# Patient Record
Sex: MALE | Race: WHITE | NOT HISPANIC OR LATINO | Employment: OTHER | URBAN - METROPOLITAN AREA
[De-identification: names, ages, dates, MRNs, and addresses within clinical notes are randomized per-mention and may not be internally consistent; named-entity substitution may affect disease eponyms.]

---

## 2017-04-13 ENCOUNTER — HOSPITAL ENCOUNTER (EMERGENCY)
Facility: HOSPITAL | Age: 62
Discharge: ANOTHER HOSPITAL | End: 2017-04-13
Attending: SURGERY
Payer: OTHER MISCELLANEOUS

## 2017-04-13 VITALS
TEMPERATURE: 98 F | HEART RATE: 76 BPM | WEIGHT: 248 LBS | RESPIRATION RATE: 16 BRPM | DIASTOLIC BLOOD PRESSURE: 80 MMHG | SYSTOLIC BLOOD PRESSURE: 154 MMHG

## 2017-04-13 DIAGNOSIS — S67.22XA HAND CRUSH INJURY, LEFT, INITIAL ENCOUNTER: Primary | ICD-10-CM

## 2017-04-13 DIAGNOSIS — S62.92XA HAND FRACTURE, LEFT, CLOSED, INITIAL ENCOUNTER: ICD-10-CM

## 2017-04-13 PROCEDURE — 96365 THER/PROPH/DIAG IV INF INIT: CPT

## 2017-04-13 PROCEDURE — 96375 TX/PRO/DX INJ NEW DRUG ADDON: CPT

## 2017-04-13 PROCEDURE — 25000003 PHARM REV CODE 250: Performed by: SURGERY

## 2017-04-13 PROCEDURE — 90471 IMMUNIZATION ADMIN: CPT | Performed by: SURGERY

## 2017-04-13 PROCEDURE — 90715 TDAP VACCINE 7 YRS/> IM: CPT | Performed by: SURGERY

## 2017-04-13 PROCEDURE — 99285 EMERGENCY DEPT VISIT HI MDM: CPT | Mod: 25

## 2017-04-13 PROCEDURE — 96367 TX/PROPH/DG ADDL SEQ IV INF: CPT

## 2017-04-13 PROCEDURE — 63600175 PHARM REV CODE 636 W HCPCS: Performed by: SURGERY

## 2017-04-13 RX ORDER — HYDROMORPHONE HYDROCHLORIDE 1 MG/ML
1 INJECTION, SOLUTION INTRAMUSCULAR; INTRAVENOUS; SUBCUTANEOUS
Status: COMPLETED | OUTPATIENT
Start: 2017-04-13 | End: 2017-04-13

## 2017-04-13 RX ADMIN — HYDROMORPHONE HYDROCHLORIDE 1 MG: 1 INJECTION, SOLUTION INTRAMUSCULAR; INTRAVENOUS; SUBCUTANEOUS at 10:04

## 2017-04-13 RX ADMIN — CLOSTRIDIUM TETANI TOXOID ANTIGEN (FORMALDEHYDE INACTIVATED), CORYNEBACTERIUM DIPHTHERIAE TOXOID ANTIGEN (FORMALDEHYDE INACTIVATED), BORDETELLA PERTUSSIS TOXOID ANTIGEN (GLUTARALDEHYDE INACTIVATED), BORDETELLA PERTUSSIS FILAMENTOUS HEMAGGLUTININ ANTIGEN (FORMALDEHYDE INACTIVATED), BORDETELLA PERTUSSIS PERTACTIN ANTIGEN, AND BORDETELLA PERTUSSIS FIMBRIAE 2/3 ANTIGEN 0.5 ML: 5; 2; 2.5; 5; 3; 5 INJECTION, SUSPENSION INTRAMUSCULAR at 08:04

## 2017-04-13 RX ADMIN — PROMETHAZINE HYDROCHLORIDE 12.5 MG: 25 INJECTION, SOLUTION INTRAMUSCULAR; INTRAVENOUS at 10:04

## 2017-04-13 RX ADMIN — SODIUM CHLORIDE 500 ML: 0.9 INJECTION, SOLUTION INTRAVENOUS at 09:04

## 2017-04-13 RX ADMIN — SODIUM CHLORIDE 3 G: 9 INJECTION, SOLUTION INTRAVENOUS at 09:04

## 2017-04-14 NOTE — ED PROVIDER NOTES
[facility]                                        April 13, 2017                   Chief Complaint  62 y.o. male with Hand Injury (lt hand trauma)    History of Present Illness  Lauri Ramsey presents to the emergency room with after left hand crush injury  Patient crush his left hand between a forklift and a car prior to ER arrival today  Patient has a deformed left hand with obvious fractures, superficial lacerations  Patient has good distal pulses and capillary refill, is neurovascular intact today  X-ray shows fractures to the 2nd and third metacarpals with gross displacement  Pt will need immediate evaluation for hand surgery, hand irrigated and soaked    The history is provided by the patient  History reviewed. No pertinent past medical history.  No past surgical history on file.   No Known Allergies     Review of Systems and Physical Exam     Review of Systems  -- Constitution - no fever, denies fatigue, no weakness, no chills  -- Eyes - no tearing or redness, no visual disturbance  -- Ear, Nose - no tinnitus or earache, no nasal congestion or discharge  -- Mouth,Throat - no sore throat, no toothache, normal voice, normal swallowing  -- Respiratory - denies cough and congestion, no shortness of breath, no MONTESINOS  -- Cardiovascular - denies chest pain, no palpitations, denies claudication  -- Gastrointestinal - denies abdominal pain, nausea, vomiting, or diarrhea  -- Musculoskeletal - crush left hand between a forklift and a car  -- Neurological - no headache, denies weakness or seizure; no LOC  -- Skin - denies pallor, rash, or changes in skin. no hives or welts noted    Vital Signs  -- His blood pressure is 180/76 and his pulse is 88. His respiration is 16.      Physical Exam  -- Nursing note and vitals reviewed  -- Head: Atraumatic. Normocephalic. No obvious abnormality  -- Eyes: Pupils are equal and reactive to light. Normal conjunctiva and lids  -- Cardiac: Normal rate, regular rhythm  and normal heart sounds  -- Pulmonary: Normal respiratory effort, breath sounds clear to auscultation  -- Abdominal: Soft, no tenderness. Normal bowel sounds. Normal liver edge  -- Musculoskeletal: Gross deformity and abrasions to the left hand  -- Neurological: No focal deficits. Showed good interaction with staff  -- Vascular: Posterior tibial, dorsalis pedis and radial pulses 2+ bilaterally    -- Lymphatics: No cervical or peripheral lymphadenopathy. No edema noted    Emergency Room Course     Treatment and Evaluation  -- Gross fractures of the second and third metacarpals of the left hand  -- Hand clean and wash and bandaged    Medications Given  -- sodium chloride 0.9% bolus 500 mL (not administered)   -- ampicillin-sulbactam 3 g in sodium chloride 0.9 % 100 mL IVPB    -- Tdap vaccine injection 0.5 mL (0.5 mLs Intramuscular Given 4/13/17 2036)     Diagnosis  -- The primary encounter diagnosis was Hand crush injury, left, initial encounter.   -- A diagnosis of Hand fracture, left, closed, initial encounter was also pertinent to this visit.    Disposition and Plan  -- Disposition: transfer  -- Condition: stable  -- The patient needs a higher level of care  -- The patient is appropriate for transfer  -- The patient was accepted for transfer at higher level of care    This note is dictated on Dragon Natural Speaking word recognition program.  There are word recognition mistakes that are occasionally missed on review.           Mikhail Davis MD  04/13/17 2200

## 2017-04-14 NOTE — ED TRIAGE NOTES
HAD A FORKLIFT SMASH LT HAND.  DEFORMITY NOTED.  NO NEURO DEFICITS. GOOD DISTAL SENSATION TO FINGERS.

## 2017-04-14 NOTE — ED NOTES
[facility] [place] ACCEPTED PT PER XU MARTIN MD.  [phone].  [facility]

## 2020-01-26 PROBLEM — I21.3 STEMI (ST ELEVATION MYOCARDIAL INFARCTION): Status: ACTIVE | Noted: 2020-01-26

## 2020-01-28 PROBLEM — E78.5 HYPERLIPIDEMIA: Status: ACTIVE | Noted: 2020-01-28

## 2020-01-28 PROBLEM — I25.5 ISCHEMIC CARDIOMYOPATHY: Status: ACTIVE | Noted: 2020-01-28
